# Patient Record
Sex: FEMALE | Race: WHITE | Employment: OTHER | URBAN - METROPOLITAN AREA
[De-identification: names, ages, dates, MRNs, and addresses within clinical notes are randomized per-mention and may not be internally consistent; named-entity substitution may affect disease eponyms.]

---

## 2017-04-17 ENCOUNTER — ALLSCRIPTS OFFICE VISIT (OUTPATIENT)
Dept: OTHER | Facility: OTHER | Age: 57
End: 2017-04-17

## 2017-10-25 ENCOUNTER — ALLSCRIPTS OFFICE VISIT (OUTPATIENT)
Dept: OTHER | Facility: OTHER | Age: 57
End: 2017-10-25

## 2017-10-26 NOTE — PROGRESS NOTES
Assessment  1  Fibromyalgia (729 1) (M79 7)   2  CRPS (complex regional pain syndrome), upper limb (337 21) (G90 519)   3  Trigeminal neuralgia (350 1) (G50 0)    Plan  CRPS (complex regional pain syndrome), upper limb, Fibromyalgia    · DULoxetine HCl - 30 MG Oral Capsule Delayed Release Particles (Cymbalta);  take 2 capsules by mouth twice a day   Rx By: Cesar Ornelas; Dispense: 30 Days ; #:120 Capsule Delayed Release Particles; Refill: 6;For: CRPS (complex regional pain syndrome), upper limb, Fibromyalgia; ARIES = N; Verified Transmission to [facility]; Last Updated By: System, [employer]; 10/25/2017 9:57:59 AM  Trigeminal neuralgia    · Follow-up visit in 6 months Evaluation and Treatment  Follow-up  Status: Hold For -  Scheduling  Requested for: 80NCF6253   Ordered; For: Trigeminal neuralgia; Ordered By: Cesar Ornelas Performed:  Due: 44UET8392   · Continue with our present treatment plan ; Status:Complete;   Done: 79SSJ3217   Ordered; For:Trigeminal neuralgia; Ordered By:Maxx Zaldivar;    Discussion/Summary  Discussion Summary:   Patient with a history of chronic fibromyalgia, right brachial plexopathy, trigeminal nerve dysfunction is advised to follow-up with pain management, she has been off all opiates, will continue with Cymbalta at the present dosage and home exercise program is encouraged  She will return back to see me in 6 months  Chief Complaint  Chief Complaint Free Text Note Form: Patient present for follow up appointment for trigeminal neuralgia, brachial plexopathy, and CRPS  History of Present Illness  HPI: Patient is here for a follow-up visit with a history of chronic fibromyalgia, trigeminal nerve dysfunction and brachial plexopathy  Patient overall has been  maintaining herself, remains on Cymbalta 60 mg twice a day, and also is followed up with pain management and gets sphenopalatine blocks for the trigeminal nerve dysfunction   She has good days and bad days, especially with weather related changes  Patient denies any new neurological symptoms  Review of Systems  Neurological ROS:   Constitutional: chills  HEENT: hoarseness  Cardiovascular:  no chest pain or pressure, no palpitations present, the heart rate was not rapid or irregular, no swelling in the arms or legs, no poor circulation  Respiratory: unusual or persistant cough  Gastrointestinal:  no nausea, no vomiting, no diarrhea, no abdominal pain, no changes in bowel habits, no melena, no loss of bowel control  Genitourinary:  no incontinence, no feelings of urinary urgency, no increase in frequency, no urinary hesitancy, no dysuria, no hematuria  Musculoskeletal: arthralgias,-- myalgias,-- immobility or loss of function-- and-- head/neck/back pain  Integumentary  no masses, no rash, no skin lesions, no livedo reticularis  Psychiatric:  no anxiety, no depression, no mood swings, no psychiatric hospitalizations, no sleep problems  Endocrine  no unusual weight loss or gain, no excessive urination, no excessive thirst, no hair loss or gain, no hot or cold intolerance, no menstrual period change or irregularity, no loss of sexual ability or drive, no erection difficulty, no nipple discharge  Hematologic/Lymphatic:  no unusual bleeding, no tendency for easy bruising, no clotting skin or lumps  Neurological General: headache,-- trouble falling asleep-- and-- waking up at night  Neurological Mental Status:  no confusion, no mood swings, no alteration or loss of consciousness, no difficulty expressing/understanding speech, no memory problems  Neurological Cranial Nerves: blurry or double vision,-- loss of vision-- and-- facial numbness or weakness  Neurological Motor findings include:  no tremor, no twitching, no cramping(pre/post exercise), no atrophy  Neurological Coordination: balance difficulties-- and-- clumsiness  Neurological Sensory: numbness-- and-- tingling     Neurological Gait: has had falls  ROS Reviewed:   ROS reviewed  Active Problems  1  Brachial plexopathy (353 0) (G54 0)   2  CRPS (complex regional pain syndrome), upper limb (337 21) (G90 519)   3  Fibromyalgia (729 1) (M79 7)   4  Hoarseness (784 42) (R49 0)   5  Laryngitis (464 00) (J04 0)   6  Legally blind (369 4) (H54 8)   7  MVP (mitral valve prolapse) (424 0) (I34 1)   8  Palpitations (785 1) (R00 2)   9  Shortness of breath (786 05) (R06 02)   10  Stroke syndrome (434 91) (I63 9)   11  Trigeminal neuralgia (350 1) (G50 0)    Past Medical History  1  History of Syncope and collapse (780 2) (R55)    Surgical History  1  History of Gallbladder Surgery   2  History of Hysterectomy   3  History of Shoulder Surgery Right   4  History of Tonsillectomy    Family History  Mother    1  Family history of Healthy adult  Father    2  Family history of malignant neoplasm of urinary bladder (V16 52) (Z80 52)  Maternal Grandmother    3  Family history of malignant neoplasm of breast (V16 3) (Z80 3)   4  Family history of malignant neoplasm of cervix uteri (V16 49) (Z80 49)  Maternal Grandfather    5  Family history of congestive heart failure (V17 49) (Z82 49)  Paternal Grandfather    6  Family history of myocardial infarction (V17 3) (Z82 49)  Maternal Aunt    7  Family history of leukemia (V16 6) (Z80 6)  Maternal Uncle    8  Family history of leukemia (V16 6) (Z80 6)    Social History   · Disabled   ·    · Never a smoker  Social History Reviewed: The social history was reviewed and updated today  Current Meds   1  Breo Ellipta 100-25 MCG/INH Inhalation Aerosol Powder Breath Activated; Therapy: (Recorded:90Qdr2329) to Recorded   2  Calcium 600 600 MG Oral Tablet; TAKE 2 TABLETS DAILY; Therapy: (Recorded:71Vhg9696) to Recorded   3  Cranberry TABS; 4200mg with Vitamin C Daily; Therapy: (Recorded:01Apr2016) to Recorded   4  Daily Multi Oral Tablet; TAKE 1 TABLET DAILY;    Therapy: (Recorded:01Apr2016) to Recorded   5  DULoxetine HCl - 30 MG Oral Capsule Delayed Release Particles; take 2 capsules by   mouth twice a day; Therapy: 54Kox2919 to (Evaluate:29Kkd0021)  Requested for: 20Jan2017; Last   Rx:20Jan2017 Ordered   6  Montelukast Sodium 10 MG Oral Tablet; Take 1 tablet daily; Therapy: (Recorded:23Pkm6917) to Recorded   7  Vitamin B-12 5000 MCG Sublingual Tablet Sublingual; DISSOLVE 1 TABLET Daily; Therapy: (Recorded:53Zda9145) to Recorded   8  Vitamin D3 5000 UNIT Oral Capsule; 2-3 caps daily; Therapy: (Recorded:01Apr2016) to Recorded  Medication List Reviewed: The medication list was reviewed and updated today  Allergies  1  Aspirin TABS   2  Benadryl   3  Erythromycin TABS   4  lidocaine   5  Macrobid CAPS   6  nitroglycerin   7  Novocain SOLN   8  Penicillins   9  Sulfa Drugs   10  tetracycline  11  Dairy   12  Other   13  IVP Dye   14  Seasonal    Vitals  Signs   Recorded: 73WBP4503 09:39AM   Heart Rate: 82  Systolic: 504  Diastolic: 74  Height: 5 ft 3 in  Weight: 143 lb   BMI Calculated: 25 33  BSA Calculated: 1 68    Physical Exam    Constitutional   General appearance: No acute distress, well appearing and well nourished  Musculoskeletal   Gait and station: Normal gait, stance and balance  Muscle strength: Abnormal  -- Patient has mild weakness diffusely in the right upper extremity  Muscle tone: No atrophy, abnormal movements, flaccidity, cogwheeling or spasticity  Neurologic   Orientation to person, place, and time: Normal     Language: Names objects, able to repeat phrases and speaks spontaneously  3rd, 4th, and 6th cranial nerves: Normal     5th cranial nerve: Abnormal  -- Patient has evidence of hypersensitivity to pinprick and light touch on the right side of the face mostly in the V2 distribution  7th cranial nerve: Normal     Sensation: Normal     Reflexes: Abnormal  -- Deep tendon reflexes are diminished in the right upper extremity     Coordination: Normal  Signatures   Electronically signed by :  Maikel Lebron MD; Oct 25 2017 11:19AM EST                       (Author)

## 2018-01-12 VITALS
HEIGHT: 63 IN | HEART RATE: 82 BPM | DIASTOLIC BLOOD PRESSURE: 74 MMHG | BODY MASS INDEX: 25.34 KG/M2 | SYSTOLIC BLOOD PRESSURE: 108 MMHG | WEIGHT: 143 LBS

## 2018-01-13 VITALS
HEART RATE: 80 BPM | HEIGHT: 63 IN | BODY MASS INDEX: 23.57 KG/M2 | WEIGHT: 133 LBS | SYSTOLIC BLOOD PRESSURE: 104 MMHG | DIASTOLIC BLOOD PRESSURE: 68 MMHG

## 2018-01-15 NOTE — MISCELLANEOUS
Dear Ms Aggarwal: We missed you for your originally scheduled neurological followup appointment with Dr Chhaya Guido  Please call at your earliest convenience to reschedule this appointment      Sincerely,     Briseida Fulton  144           Electronically signed by:Lachelle Rivera   Jan 14 2016  9:26AM EST Author

## 2018-04-13 ENCOUNTER — TELEPHONE (OUTPATIENT)
Dept: NEUROLOGY | Facility: CLINIC | Age: 58
End: 2018-04-13

## 2018-04-13 NOTE — TELEPHONE ENCOUNTER
pt called and states that wednesday night she couldn't see out of her left eye, she only had blurry vision/dark medially  lasted about 45-50 mins, but she states that she closed her kept her eyes closed and then fell asleep and woke up yesterday morning and was ok  she states that she had a weird headache(stabbing pain) to top r side of her head  this occurred right before she lost her vision  she states that she fell asleep with ha   no other signs of weakness  no symptoms today      [phone]

## 2018-04-13 NOTE — TELEPHONE ENCOUNTER
Discussed symptoms with patient  She reports having migraine headaches when she was younger and explained that this transient vision disturbance associated with the headache could have been a migraine related phenomena  I cannot exclude a problem with her eye or more central in neurologic issue  Given the fact that she has been asymptomatic for over 24 hr will monitor this    If she has new symptoms have recommended that she go to the emergency room to be evaluated or at least go to her eye doctor

## 2018-05-08 ENCOUNTER — OFFICE VISIT (OUTPATIENT)
Dept: NEUROLOGY | Facility: CLINIC | Age: 58
End: 2018-05-08
Payer: OTHER MISCELLANEOUS

## 2018-05-08 VITALS
WEIGHT: 139 LBS | SYSTOLIC BLOOD PRESSURE: 126 MMHG | DIASTOLIC BLOOD PRESSURE: 66 MMHG | BODY MASS INDEX: 23.73 KG/M2 | HEIGHT: 64 IN | HEART RATE: 98 BPM

## 2018-05-08 DIAGNOSIS — G54.0 BRACHIAL PLEXOPATHY: ICD-10-CM

## 2018-05-08 DIAGNOSIS — G50.0 TRIGEMINAL NEURALGIA: Primary | ICD-10-CM

## 2018-05-08 PROCEDURE — 99214 OFFICE O/P EST MOD 30 MIN: CPT | Performed by: PSYCHIATRY & NEUROLOGY

## 2018-05-08 RX ORDER — MAG HYDROX/ALUMINUM HYD/SIMETH 400-400-40
SUSPENSION, ORAL (FINAL DOSE FORM) ORAL
COMMUNITY

## 2018-05-08 RX ORDER — PHENOL 1.4 %
2 AEROSOL, SPRAY (ML) MUCOUS MEMBRANE DAILY
COMMUNITY

## 2018-05-08 RX ORDER — DULOXETIN HYDROCHLORIDE 30 MG/1
60 CAPSULE, DELAYED RELEASE ORAL 2 TIMES DAILY
Qty: 120 CAPSULE | Refills: 6 | Status: SHIPPED | OUTPATIENT
Start: 2018-05-08 | End: 2020-12-14 | Stop reason: SDUPTHER

## 2018-05-08 RX ORDER — FLUTICASONE FUROATE AND VILANTEROL TRIFENATATE 100; 25 UG/1; UG/1
1 POWDER RESPIRATORY (INHALATION) DAILY
Refills: 6 | COMMUNITY
Start: 2018-04-26

## 2018-05-08 RX ORDER — DULOXETIN HYDROCHLORIDE 30 MG/1
2 CAPSULE, DELAYED RELEASE ORAL 2 TIMES DAILY
COMMUNITY
Start: 2016-09-12 | End: 2018-05-08 | Stop reason: SDUPTHER

## 2018-05-08 NOTE — PROGRESS NOTES
Progress Note - Neurology   Do Fernanda 59 y o  female MRN: [id number]  Unit/Bed#:  Encounter: [id number]      Subjective:   Patient is here for a follow-up visit with a history of trigeminal neuralgia, right brachial plexopathy posttraumatic secondary to work related injury several years ago  Overall she has been maintaining herself with good days and bad days also followed up with pain management and receives trigeminal nerve blocks, for pain relief  She denies any new neurological symptoms and is moving to [place] in the near future  Patient remains on Cymbalta 60 mg twice a day right upper extremity pain  ROS:   Review of Systems   Constitutional: Positive for fatigue  HENT: Positive for voice change  Eyes: Negative  Respiratory: Negative  Cardiovascular: Negative  Gastrointestinal: Negative  Endocrine: Negative  Genitourinary: Negative  Musculoskeletal: Positive for gait problem, joint swelling and neck pain  Skin: Negative  Allergic/Immunologic: Negative  Neurological: Positive for dizziness, numbness and headaches  Hematological: Bruises/bleeds easily  Psychiatric/Behavioral: Positive for sleep disturbance  Vitals:   Vitals:    05/08/18 1411   BP: 126/66   Pulse: 98   ,Body mass index is 24 24 kg/m²      MEDS:      Current Outpatient Prescriptions:     DULoxetine (CYMBALTA) 30 mg delayed release capsule, Take 2 capsules by mouth 2 (two) times a day, Disp: , Rfl:     BREO ELLIPTA, Inhale 1 puff daily, Disp: , Rfl: 6    calcium carbonate (CALCIUM 600) 600 MG tablet, Take 2 tablets by mouth daily, Disp: , Rfl:     Cholecalciferol (VITAMIN D3) 5000 units CAPS, Take by mouth, Disp: , Rfl:     Cranberry 600 MG TABS, Take by mouth, Disp: , Rfl:     Cyanocobalamin (VITAMIN B-12) 5000 MCG SUBL, Place 1 tablet under the tongue daily, Disp: , Rfl:     Multiple Vitamins-Minerals (DAILY MULTI PO), Take 1 tablet by mouth daily, Disp: , Rfl:   :    Physical Exam:  General appearance: alert, appears stated age and cooperative  Head: Normocephalic, without obvious abnormality, atraumatic    Neurologic:  On examination patient has evidence of hyperalgesia in the right V2 distribution, mild weakness is noted in the right upper extremity distally and proximally in the 5-/5 range, and no new deficits were noted on motor and sensory exam     Lab Results: I have personally reviewed pertinent reports  Imaging Studies: I have personally reviewed pertinent reports  Assessment:  1  Right trigeminal neuralgia  2  Right brachial plexopathy  3  History of fibromyalgia  Plan:  Continue present medications and home exercise program   She will now return back to see me on a p r n  basis  Patient will follow up with Neurology in [place]  5/8/2018,2:30 PM    Dictation voice to text software has been used in the creation of this document  Please consider this in light of any contextual or grammatical errors

## 2020-12-14 ENCOUNTER — TELEPHONE (OUTPATIENT)
Dept: NEUROLOGY | Facility: CLINIC | Age: 60
End: 2020-12-14

## 2020-12-14 ENCOUNTER — TELEMEDICINE (OUTPATIENT)
Dept: NEUROLOGY | Facility: CLINIC | Age: 60
End: 2020-12-14
Payer: OTHER MISCELLANEOUS

## 2020-12-14 DIAGNOSIS — G50.0 TRIGEMINAL NEURALGIA: ICD-10-CM

## 2020-12-14 PROCEDURE — 99213 OFFICE O/P EST LOW 20 MIN: CPT | Performed by: PSYCHIATRY & NEUROLOGY

## 2020-12-14 RX ORDER — MONTELUKAST SODIUM 10 MG/1
10 TABLET ORAL DAILY
COMMUNITY
Start: 2020-12-01

## 2020-12-14 RX ORDER — DULOXETIN HYDROCHLORIDE 30 MG/1
60 CAPSULE, DELAYED RELEASE ORAL 2 TIMES DAILY
Qty: 120 CAPSULE | Refills: 6 | Status: SHIPPED | OUTPATIENT
Start: 2020-12-14 | End: 2022-01-05 | Stop reason: SDUPTHER

## 2020-12-14 RX ORDER — OMEGA-3-ACID ETHYL ESTERS 1 G/1
2 CAPSULE, LIQUID FILLED ORAL 2 TIMES DAILY
COMMUNITY

## 2020-12-14 RX ORDER — DULOXETIN HYDROCHLORIDE 30 MG/1
120 CAPSULE, DELAYED RELEASE ORAL 2 TIMES DAILY
Qty: 120 CAPSULE | Refills: 6 | Status: SHIPPED | OUTPATIENT
Start: 2020-12-14 | End: 2020-12-14 | Stop reason: SDUPTHER

## 2021-07-30 ENCOUNTER — TELEPHONE (OUTPATIENT)
Dept: NEUROLOGY | Facility: CLINIC | Age: 61
End: 2021-07-30

## 2021-07-30 NOTE — TELEPHONE ENCOUNTER
Received a call from [facility] with [facility]  She stated she is working on the [employer]  claim and wanted to know if the patient had been seen since her Dec 2020 appt  Informed her that she has not been seen since and does not have any f/u appts scheduled  She verbalized understanding, nothing further needed at this time

## 2022-01-05 DIAGNOSIS — G50.0 TRIGEMINAL NEURALGIA: ICD-10-CM

## 2022-01-05 RX ORDER — DULOXETIN HYDROCHLORIDE 30 MG/1
60 CAPSULE, DELAYED RELEASE ORAL 2 TIMES DAILY
Qty: 120 CAPSULE | Refills: 6 | Status: SHIPPED | OUTPATIENT
Start: 2022-01-05

## 2022-01-05 RX ORDER — DULOXETIN HYDROCHLORIDE 30 MG/1
60 CAPSULE, DELAYED RELEASE ORAL 2 TIMES DAILY
Qty: 120 CAPSULE | Refills: 6 | Status: SHIPPED | OUTPATIENT
Start: 2022-01-05 | End: 2022-01-05 | Stop reason: SDUPTHER

## 2022-01-05 NOTE — PROGRESS NOTES
On-Call Telephone Note    Contacted by ***  Wilbur Arevalo 59 y o  female MRN: [id number]  Unit/Bed#:  Encounter: [id number]    Per provider report, patient presents with ***  Available past medical history,social history, surgical history, medication list, drug allergies and review of systems were reviewed  There were no vitals taken for this visit  Clinical exam per provider report, ***  Imaging personally reviewed  ***    Assessment and Plan  1  ***  2  ***  3  ***  4  ***    All questions answered  Provider is in agreement with the course of action  A total of *** minutes was spent discussing the presentation, physical exam and formulating a management plan with the provider

## 2023-06-02 ENCOUNTER — OFFICE VISIT (OUTPATIENT)
Dept: NEUROLOGY | Facility: CLINIC | Age: 63
End: 2023-06-02

## 2023-06-02 VITALS
DIASTOLIC BLOOD PRESSURE: 60 MMHG | HEART RATE: 93 BPM | WEIGHT: 140 LBS | SYSTOLIC BLOOD PRESSURE: 110 MMHG | HEIGHT: 64 IN | BODY MASS INDEX: 23.9 KG/M2 | OXYGEN SATURATION: 97 %

## 2023-06-02 DIAGNOSIS — G50.0 TRIGEMINAL NEURALGIA: Primary | ICD-10-CM

## 2023-06-02 RX ORDER — CYCLOSPORINE 0.5 MG/ML
1 EMULSION OPHTHALMIC EVERY 12 HOURS
COMMUNITY
Start: 2023-04-14

## 2023-06-02 RX ORDER — GABAPENTIN 300 MG/1
300 CAPSULE ORAL 3 TIMES DAILY
COMMUNITY
Start: 2023-04-28

## 2023-06-02 RX ORDER — LOTEPREDNOL ETABONATE 2.5 MG/ML
SUSPENSION/ DROPS OPHTHALMIC
COMMUNITY

## 2023-06-02 NOTE — PROGRESS NOTES
Gilbert Saucedo is a 57 y o  female  Chief Complaint   Patient presents with   • Trigeminal neuralgia       Assessment:  1  Trigeminal neuralgia        Plan:  MRI scan of the brain without contrast   Blood work  Follow-up with pain specialist and other physicians  Discussion:  Dr Merlin Maddox prior notes were reviewed, I did not see any imaging studies and hence would recommend an MRI of this brain without contrast as patient is allergic to the contrast dye, also would recommend blood work she will call me after the test to discuss the results, she has a follow-up with her pain specialist Dr Renea Mendez of 4 trigeminal neuralgia and possible block, I advised the patient to discuss with him regarding Cymbalta since patient is already on gabapentin or the other option would be is to increase the gabapentin, she will let us know after she speaks to the pain specialist and family physician, to keep her blood pressure cholesterol sugar and weight under control, to take fall and safety precautions to go to the hospital if has any worsening symptoms and call me otherwise to see me back in 4 to 6 months and follow-up with her other physicians      Subjective:    HPI   58-year-old female past medical history of right-sided trigeminal neuralgia, brachial plexopathy secondary to a work-related injury several years ago who was seeing my associate Dr Marielos Nascimento and had last seen him in December 2020, she tells me that since her last visit and now she has been doing okay except that her right-sided trigeminal neuralgia is acting up she does have some right-sided facial pain radiating to the right eye like a sharp pain couple of times a week it can last for a few minutes to an hour, she denies having any headaches at baseline she has vision issues that she can barely see secondary to her eye condition she does follow-up with an ophthalmologist she used to be on Cymbalta 60 mg twice a day then she tells me that she was on 30 mg twice "a day and has not been taking Cymbalta for the last 1 year she saw her family physician and had postherpetic neuralgia in her mid back and has been started on gabapentin 300 mg 3 times a day, at baseline her symptoms in the right arm are reasonably well controlled she does have right arm weakness, she denies any other neurological complaints      Vitals:    06/02/23 1329   BP: 110/60   BP Location: Right arm   Patient Position: Sitting   Cuff Size: Standard   Pulse: 93   SpO2: 97%   Weight: 63 5 kg (140 lb)   Height: 5' 3 5\" (1 613 m)       Current Medications    Current Outpatient Medications:   •  BREO ELLIPTA, Inhale 1 puff daily, Disp: , Rfl: 6  •  Cranberry 600 MG TABS, Take by mouth, Disp: , Rfl:   •  DULoxetine (CYMBALTA) 30 mg delayed release capsule, Take 2 capsules (60 mg total) by mouth 2 (two) times a day, Disp: 120 capsule, Rfl: 6  •  gabapentin (NEURONTIN) 300 mg capsule, Take 300 mg by mouth 3 (three) times a day, Disp: , Rfl:   •  Loteprednol Etabonate (Eysuvis) 0 25 % SUSP, Apply to eye, Disp: , Rfl:   •  montelukast (SINGULAIR) 10 mg tablet, Take 10 mg by mouth daily, Disp: , Rfl:   •  Restasis 0 05 % ophthalmic emulsion, Administer 1 drop to both eyes every 12 (twelve) hours, Disp: , Rfl:   •  calcium carbonate (CALCIUM 600) 600 MG tablet, Take 2 tablets by mouth daily (Patient not taking: Reported on 6/2/2023), Disp: , Rfl:   •  Cholecalciferol (VITAMIN D3) 5000 units CAPS, Take by mouth (Patient not taking: Reported on 6/2/2023), Disp: , Rfl:   •  Cyanocobalamin (VITAMIN B-12) 5000 MCG SUBL, Place 1 tablet under the tongue daily (Patient not taking: Reported on 6/2/2023), Disp: , Rfl:   •  Multiple Vitamins-Minerals (DAILY MULTI PO), Take 1 tablet by mouth daily (Patient not taking: Reported on 6/2/2023), Disp: , Rfl:   •  omega-3-acid ethyl esters (LOVAZA) 1 g capsule, Take 2 g by mouth 2 (two) times a day, Disp: , Rfl:       Allergies  Amitriptyline, Metronidazole, Pollen extract, Aspirin, " Diphenhydramine, Erythromycin, Iodinated contrast media, Iodine - food allergy, Lidocaine, Nitrofurantoin, Nitroglycerin, Other, Penicillins, Procaine, Sulfa antibiotics, and Tetracycline    Past Medical History  Past Medical History:   Diagnosis Date   • Asthma    • Blind    • Brachial plexopathy    • COVID-19 08/2022   • CRPS (complex regional pain syndrome)    • Fibromyalgia, primary    • Laryngitis    • MVP (mitral valve prolapse)    • Palpitations    • RSD (reflex sympathetic dystrophy)    • Shingles 08/2022   • SOB (shortness of breath)    • Stroke [facility])    • Trigeminal neuralgia          Past Surgical History:  Past Surgical History:   Procedure Laterality Date   • GALLBLADDER SURGERY     • HAND SURGERY Left 05/2019   • HYSTERECTOMY     • SHOULDER SURGERY     • THORACIC OUTLET SURGERY     • TONSILLECTOMY           Family History:  Family History   Problem Relation Age of Onset   • Heart attack Mother    • Heart failure Mother    • Cancer Father         bladder cancer   • Dementia Father    • Stroke Brother    • Cancer Brother         bladder cancer   • Breast cancer Maternal Grandmother    • Cervical cancer Maternal Grandmother    • Heart failure Maternal Grandfather    • Heart attack Paternal Grandfather    • Leukemia Family    • Cancer Family    • Heart attack Family    • Heart disease Family    • Dementia Family        Social History:   reports that she has never smoked  She has never used smokeless tobacco  She reports current alcohol use  She reports that she does not use drugs  I have reviewed the past medical history, surgical history, social and family history, current medications, allergies vitals, review of systems, and updated this information as appropriate today  Objective:    Physical Exam    Neurological Exam     GENERAL:  Cooperative in no acute distress  Well-developed and well-nourished     HEAD and NECK   Head is atraumatic normocephalic with no lesions or masses   Neck is supple with full range of motion     CARDIOVASCULAR  Carotid Arteries-no carotid bruits  NEUROLOGIC:  Mental Status-the patient is awake alert and oriented without aphasia or apraxia  Cranial Nerves: Patient can barely see and has photophobia and a complete eye examination could not be done face is symmetrical to light touch  Movements of facial expression move symmetrically  Hearing is normal to finger rub bilaterally  Soft palate lifts symmetrically  Shoulder shrug is symmetrical  Tongue is midline without atrophy  Motor: Right arm weakness secondary to pain 4/5 which is old left upper extremity and bilateral lower extremities is 5/5 proximally and distally  Sensory: Intact to temperature and vibratory sensation in the upper and lower extremities bilaterally  Cortical function is intact  Coordination: Finger to nose testing is performed accurately  Ambulates with a cane for safety reasons and for her vision    ROS:  Review of Systems   Constitutional: Negative  Negative for appetite change and fever  HENT: Negative  Negative for hearing loss, tinnitus, trouble swallowing and voice change  Eyes: Negative  Negative for photophobia, pain and visual disturbance  Respiratory: Negative  Negative for shortness of breath  Cardiovascular: Negative  Negative for palpitations  Gastrointestinal: Negative  Negative for nausea and vomiting  Endocrine: Negative  Negative for cold intolerance  Genitourinary: Negative  Negative for dysuria, frequency and urgency  Musculoskeletal: Negative  Negative for gait problem, myalgias and neck pain  Skin: Negative  Negative for rash  Allergic/Immunologic: Negative  Neurological: Negative  Negative for dizziness, tremors, seizures, syncope, facial asymmetry, speech difficulty, weakness, light-headedness, numbness and headaches  Hematological: Negative  Does not bruise/bleed easily  Psychiatric/Behavioral: Negative    Negative for confusion, hallucinations and sleep disturbance

## 2023-07-28 ENCOUNTER — TELEPHONE (OUTPATIENT)
Dept: NEUROLOGY | Facility: CLINIC | Age: 63
End: 2023-07-28

## 2023-07-28 NOTE — TELEPHONE ENCOUNTER
Fax received from [facility] request MRI order faxed to them.  Faxed as requested to: [phone], ATTN: raymond adames

## 2024-01-15 ENCOUNTER — TELEPHONE (OUTPATIENT)
Dept: NEUROLOGY | Facility: CLINIC | Age: 64
End: 2024-01-15

## 2024-01-18 ENCOUNTER — OFFICE VISIT (OUTPATIENT)
Dept: NEUROLOGY | Facility: CLINIC | Age: 64
End: 2024-01-18
Payer: COMMERCIAL

## 2024-01-18 VITALS
BODY MASS INDEX: 21.97 KG/M2 | HEIGHT: 67 IN | WEIGHT: 140 LBS | OXYGEN SATURATION: 97 % | HEART RATE: 81 BPM | SYSTOLIC BLOOD PRESSURE: 106 MMHG | DIASTOLIC BLOOD PRESSURE: 82 MMHG

## 2024-01-18 DIAGNOSIS — G50.0 TRIGEMINAL NEURALGIA: Primary | ICD-10-CM

## 2024-01-18 PROCEDURE — 99213 OFFICE O/P EST LOW 20 MIN: CPT | Performed by: PSYCHIATRY & NEUROLOGY

## 2024-01-18 NOTE — PROGRESS NOTES
Pastora Aggarwal is a 63 y.o. female.   Chief Complaint   Patient presents with    Neurologic Problem     Experiencing pain, weakness and numbness.         Assessment:  1. Trigeminal neuralgia         Plan:    Patient was advised to get the blood work that was ordered at the last visit, is going to do that and call me after that to follow-up on the results her MRI scan of the brain report was normal, I do not have the actual CD to review the films, she is going to give that to the radiology to upload the films, her trigeminal neuralgia seems to be under control she is on Cymbalta 60 mg twice a day that is being managed by her pain specialist Dr. Bronson, she does have issues with her vision for the last 25 years and she does follow-up with an ophthalmologist and is not seeing me for that.    She was advised to keep a blood pressure cholesterol and sugar under control to take fall and safety precautions, continue follow-up with her other physicians including her pain specialist family physician and ophthalmologist to go to the hospital if has any worsening symptoms and call me otherwise to see me back in 6 months or sooner if needed.        Subjective:  63-year-old female with past medical history of right-sided trigeminal neuralgia brachial plexopathy secondary to work-related injury several years ago in follow-up for her trigeminal neuralgia she was seeing Dr. Zaldivar in the past and since she last saw me she tells me that she has been doing reasonably okay with her trigeminal neuralgia she does have some mild right-sided facial pain radiating to the right eye but overall she seems to be doing good she is on Cymbalta 60 mg twice a day and seems to be tolerating it well, she also follows up with an ophthalmologist regarding her vision issues, she is currently not taking gabapentin and her symptoms in the right arm are reasonably well-controlled at baseline she has some mild right arm weakness denies any other  neurological complaints.         There were no vitals filed for this visit.     Current Medications    Current Outpatient Medications:     BREO ELLIPTA, Inhale 1 puff daily, Disp: , Rfl: 6    calcium carbonate (CALCIUM 600) 600 MG tablet, Take 2 tablets by mouth daily (Patient not taking: Reported on 6/2/2023), Disp: , Rfl:     Cholecalciferol (VITAMIN D3) 5000 units CAPS, Take by mouth (Patient not taking: Reported on 6/2/2023), Disp: , Rfl:     Cranberry 600 MG TABS, Take by mouth, Disp: , Rfl:     Cyanocobalamin (VITAMIN B-12) 5000 MCG SUBL, Place 1 tablet under the tongue daily (Patient not taking: Reported on 6/2/2023), Disp: , Rfl:     DULoxetine (CYMBALTA) 30 mg delayed release capsule, Take 2 capsules (60 mg total) by mouth 2 (two) times a day, Disp: 120 capsule, Rfl: 6    gabapentin (NEURONTIN) 300 mg capsule, Take 300 mg by mouth 3 (three) times a day, Disp: , Rfl:     Loteprednol Etabonate (Eysuvis) 0.25 % SUSP, Apply to eye, Disp: , Rfl:     montelukast (SINGULAIR) 10 mg tablet, Take 10 mg by mouth daily, Disp: , Rfl:     Multiple Vitamins-Minerals (DAILY MULTI PO), Take 1 tablet by mouth daily (Patient not taking: Reported on 6/2/2023), Disp: , Rfl:     omega-3-acid ethyl esters (LOVAZA) 1 g capsule, Take 2 g by mouth 2 (two) times a day, Disp: , Rfl:     Restasis 0.05 % ophthalmic emulsion, Administer 1 drop to both eyes every 12 (twelve) hours, Disp: , Rfl:       Allergies  Amitriptyline, Metronidazole, Pollen extract, Aspirin, Diphenhydramine, Erythromycin, Iodinated contrast media, Iodine - food allergy, Lidocaine, Nitrofurantoin, Nitroglycerin, Other, Penicillins, Procaine, Sulfa antibiotics, and Tetracycline    Past Medical History  Past Medical History:   Diagnosis Date    Asthma     Blind     Brachial plexopathy     COVID-19 08/2022    CRPS (complex regional pain syndrome)     Fibromyalgia, primary     Laryngitis     MVP (mitral valve prolapse)     Palpitations     RSD (reflex sympathetic  dystrophy)     Shingles 08/2022    SOB (shortness of breath)     Stroke (HCC)     Trigeminal neuralgia          Past Surgical History:  Past Surgical History:   Procedure Laterality Date    GALLBLADDER SURGERY      HAND SURGERY Left 05/2019    HYSTERECTOMY      SHOULDER SURGERY      THORACIC OUTLET SURGERY      TONSILLECTOMY           Family History:  Family History   Problem Relation Age of Onset    Heart attack Mother     Heart failure Mother     Cancer Father         bladder cancer    Dementia Father     Stroke Brother     Cancer Brother         bladder cancer    Breast cancer Maternal Grandmother     Cervical cancer Maternal Grandmother     Heart failure Maternal Grandfather     Heart attack Paternal Grandfather     Leukemia Family     Cancer Family     Heart attack Family     Heart disease Family     Dementia Family        Social History:   reports that she has never smoked. She has never used smokeless tobacco. She reports current alcohol use. She reports that she does not use drugs.    I have reviewed the past medical history, surgical history, social and family history, current medications, allergies vitals, review of systems, and updated this information as appropriate today.   Objective:    Physical Exam    Neurological Exam     GENERAL:  Cooperative in no acute distress. Well-developed and well-nourished     HEAD and NECK   Head is atraumatic normocephalic with no lesions or masses. Neck is supple with full range of motion     CARDIOVASCULAR  Carotid Arteries-no carotid bruits.     NEUROLOGIC:  Mental Status-the patient is awake alert and oriented without aphasia or apraxia  Cranial Nerves: Patient can barely open her eyes secondary to photophobia and has difficulty with her vision a complete eye examination could not be done  Face is symmetrical to light touch. Movements of facial expression move symmetrically. Hearing is normal to finger rub bilaterally. Soft palate lifts symmetrically. Shoulder shrug  is symmetrical. Tongue is midline without atrophy.  Motor: No drift is noted on arm extension. Strength is full in the upper and lower extremities with normal bulk and tone.  Except for right arm weakness secondary to pain 4 x 5 which is old    Ambulates with a cane for safety reasons and for revision    ROS:  Review of Systems   Musculoskeletal:  Positive for back pain.   Neurological:  Positive for headaches.     Otherwise negative in detail

## 2024-08-22 ENCOUNTER — TELEPHONE (OUTPATIENT)
Dept: NEUROLOGY | Facility: CLINIC | Age: 64
End: 2024-08-22

## 2024-08-23 ENCOUNTER — TELEPHONE (OUTPATIENT)
Age: 64
End: 2024-08-23

## 2024-08-23 NOTE — TELEPHONE ENCOUNTER
I called and spoke with the patient; advised that her wc carrier was billed and paid on 8/1/24 for date of service 1/18/24 with Dr. Stout, and that the billing office did a credit reversal to her health insurance carrier on 8/13. The patient was appreciative of the phone call.

## 2024-08-23 NOTE — TELEPHONE ENCOUNTER
Pt would like it noted that all of her visits with neuro should be under WC only, she has been getting billed from  under her medical insurance and has spoken to Billing and is still getting charged.

## 2025-01-23 ENCOUNTER — TELEPHONE (OUTPATIENT)
Dept: NEUROLOGY | Facility: CLINIC | Age: 65
End: 2025-01-23

## 2025-01-23 NOTE — TELEPHONE ENCOUNTER
LVM for patient to call me back to reschedule 2/18 appointment as provider will not be in the office. Please contact me for scheduling. We have the first week in February available.

## 2025-01-27 NOTE — TELEPHONE ENCOUNTER
Spoke with patient and rescheduled appt to 2/6/25 @ 11am. Patient stated she will call back to change appt if she can not get a ride. She does not drive.